# Patient Record
Sex: FEMALE | Race: ASIAN | NOT HISPANIC OR LATINO
[De-identification: names, ages, dates, MRNs, and addresses within clinical notes are randomized per-mention and may not be internally consistent; named-entity substitution may affect disease eponyms.]

---

## 2022-05-19 ENCOUNTER — RESULT REVIEW (OUTPATIENT)
Age: 32
End: 2022-05-19

## 2024-01-24 ENCOUNTER — ASOB RESULT (OUTPATIENT)
Age: 34
End: 2024-01-24

## 2024-01-24 ENCOUNTER — APPOINTMENT (OUTPATIENT)
Dept: ANTEPARTUM | Facility: CLINIC | Age: 34
End: 2024-01-24
Payer: COMMERCIAL

## 2024-01-24 ENCOUNTER — LABORATORY RESULT (OUTPATIENT)
Age: 34
End: 2024-01-24

## 2024-01-24 ENCOUNTER — APPOINTMENT (OUTPATIENT)
Dept: MATERNAL FETAL MEDICINE | Facility: CLINIC | Age: 34
End: 2024-01-24
Payer: OTHER GOVERNMENT

## 2024-01-24 PROCEDURE — 76801 OB US < 14 WKS SINGLE FETUS: CPT

## 2024-01-24 PROCEDURE — 76945 ECHO GUIDE VILLUS SAMPLING: CPT

## 2024-01-24 PROCEDURE — 59015 CHORION BIOPSY: CPT

## 2024-01-24 PROCEDURE — 76813 OB US NUCHAL MEAS 1 GEST: CPT | Mod: 59

## 2024-01-24 PROCEDURE — ZZZZZ: CPT

## 2024-03-27 ENCOUNTER — ASOB RESULT (OUTPATIENT)
Age: 34
End: 2024-03-27

## 2024-03-27 ENCOUNTER — APPOINTMENT (OUTPATIENT)
Dept: ANTEPARTUM | Facility: CLINIC | Age: 34
End: 2024-03-27
Payer: OTHER GOVERNMENT

## 2024-03-27 PROCEDURE — 76811 OB US DETAILED SNGL FETUS: CPT

## 2024-06-24 ENCOUNTER — APPOINTMENT (OUTPATIENT)
Dept: ANTEPARTUM | Facility: CLINIC | Age: 34
End: 2024-06-24

## 2024-07-11 ENCOUNTER — APPOINTMENT (OUTPATIENT)
Dept: ANTEPARTUM | Facility: CLINIC | Age: 34
End: 2024-07-11
Payer: OTHER GOVERNMENT

## 2024-07-11 ENCOUNTER — OUTPATIENT (OUTPATIENT)
Dept: OUTPATIENT SERVICES | Facility: HOSPITAL | Age: 34
LOS: 1 days | Discharge: HOME | End: 2024-07-11

## 2024-07-11 VITALS
SYSTOLIC BLOOD PRESSURE: 107 MMHG | TEMPERATURE: 98 F | HEART RATE: 74 BPM | RESPIRATION RATE: 16 BRPM | DIASTOLIC BLOOD PRESSURE: 69 MMHG

## 2024-07-11 VITALS — DIASTOLIC BLOOD PRESSURE: 56 MMHG | HEART RATE: 75 BPM | SYSTOLIC BLOOD PRESSURE: 92 MMHG | TEMPERATURE: 98 F

## 2024-07-11 DIAGNOSIS — O26.899 OTHER SPECIFIED PREGNANCY RELATED CONDITIONS, UNSPECIFIED TRIMESTER: ICD-10-CM

## 2024-07-11 PROCEDURE — 76815 OB US LIMITED FETUS(S): CPT | Mod: 26

## 2024-07-11 PROCEDURE — 76819 FETAL BIOPHYS PROFIL W/O NST: CPT | Mod: 26

## 2024-07-11 RX ORDER — PRENATAL VIT/IRON FUM/FOLIC AC 60 MG-1 MG
1 TABLET ORAL
Refills: 0 | DISCHARGE

## 2024-07-11 NOTE — OB RN TRIAGE NOTE - FALL HARM RISK - UNIVERSAL INTERVENTIONS
Bed in lowest position, wheels locked, appropriate side rails in place/Call bell, personal items and telephone in reach/Instruct patient to call for assistance before getting out of bed or chair/Non-slip footwear when patient is out of bed/[place] to call system/Physically safe environment - no spills, clutter or unnecessary equipment/Purposeful Proactive Rounding/Room/bathroom lighting operational, light cord in reach

## 2024-07-12 NOTE — OB PROVIDER TRIAGE NOTE - ADDITIONAL INSTRUCTIONS
This is a 34y  @ 35w 6d female with decreased fetal movement. Reactive tracing, BPP . Discharged to home.    - Discussed with Dr. Jon  - Patient to be discharged home with follow up and return precautions  - Please follow up with your obstetrician at your next scheduled appointment.   - Please return for decreased/no fetal movement, vaginal bleeding similar to that of a period, leaking/gush of fluid, regular contractions occurring 4-5 minutes for one hour or requiring pain medication   - Patient and partner and educated of plan and demonstrate understanding. All questions answered. Discharge instructions provided and signed.   - Discharged at 0010

## 2024-07-12 NOTE — OB PROVIDER TRIAGE NOTE - NSHPPHYSICALEXAM_GEN_ALL_CORE
T(C): 36.9 (07-11-24 @ 22:24), Max: 36.9 (07-11-24 @ 22:20)  HR: 75 (07-11-24 @ 22:24) (74 - 75)  BP: 92/56 (07-11-24 @ 22:24) (92/56 - 107/69)  RR: 16 (07-11-24 @ 22:20) (16 - 16)  SpO2: --  General: Female sitting comfortably   Neuro: No facial asymmetry, no slurred speech, moves all 4 extremities  Mood: Alert and lucid, appropriate mood and affect  Head: Normocephalic. Atraumatic.   Eyes: No discharge, lids normal, conjunctiva normal  Lungs: No respiratory distress  Abdomen: Soft, nontender. Gravid.   TAUS:  cephalic, Sono saved in ASOB.   NST:   SSE:  SVE:  EFW: T(C): 36.9 (07-11-24 @ 22:24), Max: 36.9 (07-11-24 @ 22:20)  HR: 75 (07-11-24 @ 22:24) (74 - 75)  BP: 92/56 (07-11-24 @ 22:24) (92/56 - 107/69)  RR: 16 (07-11-24 @ 22:20) (16 - 16)  SpO2: --  General: Female sitting comfortably   Neuro: No facial asymmetry, no slurred speech, moves all 4 extremities  Mood: Alert and lucid, appropriate mood and affect  Head: Normocephalic. Atraumatic.   Eyes: No discharge, lids normal, conjunctiva normal  Lungs: No respiratory distress  Abdomen: Soft, nontender. Gravid.   TAUS:  cephalic, posterior, CITLALI 12.1, MVP 4.3, MVP 138bpm. BPP 8/8. Sono saved in ASOB.   NST: baseline 115, moderate variability, +accels, no decels. Reactive. Tracing reviewed by Dr. Jon.

## 2024-07-12 NOTE — OB PROVIDER TRIAGE NOTE - NSNYCREQUIREMENTS_OBGYN_ALL_OB
I have reviewed the H&P and examined the patient and find no relevant changes. I have reviewed with the patient and/or family the risks, benefits, and alternatives to the procedure(s). Past Medical History:   Diagnosis Date    Allergic rhinitis     Arthritis     Hyperlipidemia     Hypertension     Migraine headache     since 20's    Parkinson disease ([facility] [address]     Reactive airway disease     Sjogren's syndrome ([facility] [address] 2012    Dr. Gilmar Fine     No current facility-administered medications on file prior to encounter.       Current Outpatient Medications on File Prior to Encounter   Medication Sig Dispense Refill    carbidopa-levodopa (PARCOPA)  MG TBDP Take 1 tablet by mouth 4 times daily 360 tablet 1    losartan (COZAAR) 50 MG tablet Take 1 tablet by mouth daily 90 tablet 1    denosumab (PROLIA) 60 MG/ML SOLN SC injection Inject 1 mL into the skin every 6 months 1 mL 0    Calcium Carbonate-Vitamin D (OYSTER SHELL CALCIUM/D) 500-200 MG-UNIT TABS TAKE 1 TABLET BY MOUTH ONCE DAILY 30 tablet 0    guaiFENesin (MUCINEX) 600 MG extended release tablet Take 1,200 mg by mouth as needed       isosorbide mononitrate (IMDUR) 30 MG extended release tablet TAKE ONE TABLET BY MOUTH ONCE DAILY 90 tablet 0    metoprolol tartrate (LOPRESSOR) 25 MG tablet TAKE ONE TABLET BY MOUTH TWICE A DAY 60 tablet 11    amLODIPine (NORVASC) 5 MG tablet Take 5 mg by mouth daily      nitroGLYCERIN (NITROSTAT) 0.4 MG SL tablet Place 1 tablet under the tongue every 5 minutes as needed for Chest pain 25 tablet 3    Multiple Vitamins-Minerals (THERAPEUTIC MULTIVITAMIN-MINERALS) tablet Take 1 tablet by mouth daily      butalbital-acetaminophen-caffeine (FIORICET, ESGIC) -40 MG per tablet Take 1 tablet by mouth every 4 hours as needed for Headaches      montelukast (SINGULAIR) 10 MG tablet Take 10 mg by mouth nightly      meloxicam (MOBIC) 15 MG tablet Take 15 mg by mouth daily      lovastatin (MEVACOR) 20 MG tablet Take 1 tablet by mouth nightly 90 tablet 3    mometasone (NASONEX) 50 MCG/ACT nasal spray 2 sprays by Nasal route daily. Each nostril (Patient taking differently: 2 sprays by Nasal route as needed Each nostril) 3 g 3    PREVIDENT 5000 BOOSTER PLUS 1.1 % PSTE USE DAILY IN MOUTH. 100 mL 0    polyvinyl alcohol-povidone (HYPOTEARS) 1.4-0.6 % ophthalmic solution 1-2 drops as needed.  aspirin 81 MG tablet Take 81 mg by mouth daily.            Chaparrita [address]

## 2024-07-12 NOTE — OB PROVIDER TRIAGE NOTE - NSPROVTRIAGESELHIDDEN_OBGYN_ALL_OB_FT
[NS_AttendInformed_OBGYN_ALL_OB:[id number]] Nsaids Pregnancy And Lactation Text: These medications are considered safe up to 30 weeks gestation. It is excreted in breast milk.

## 2024-07-12 NOTE — OB PROVIDER TRIAGE NOTE - HISTORY OF PRESENT ILLNESS
This is a 33 y/o  at 35 w 6 d who presents with decreased fetal movement this afternoon. Pt states she has felt fetus move since in triage. Denies contractions, leakage of fluid, vaginal bleeding.     PNC with Dr. Jon. AP course uncomplicated

## 2024-07-15 DIAGNOSIS — O09.523 SUPERVISION OF ELDERLY MULTIGRAVIDA, THIRD TRIMESTER: ICD-10-CM

## 2024-07-15 DIAGNOSIS — Z3A.35 35 WEEKS GESTATION OF PREGNANCY: ICD-10-CM

## 2024-07-15 DIAGNOSIS — O36.8130 DECREASED FETAL MOVEMENTS, THIRD TRIMESTER, NOT APPLICABLE OR UNSPECIFIED: ICD-10-CM

## 2024-08-09 ENCOUNTER — APPOINTMENT (OUTPATIENT)
Dept: ANTEPARTUM | Facility: CLINIC | Age: 34
End: 2024-08-09

## 2024-08-09 ENCOUNTER — TRANSCRIPTION ENCOUNTER (OUTPATIENT)
Age: 34
End: 2024-08-09

## 2024-08-09 PROBLEM — Z78.9 OTHER SPECIFIED HEALTH STATUS: Chronic | Status: ACTIVE | Noted: 2024-07-11
